# Patient Record
Sex: MALE | URBAN - METROPOLITAN AREA
[De-identification: names, ages, dates, MRNs, and addresses within clinical notes are randomized per-mention and may not be internally consistent; named-entity substitution may affect disease eponyms.]

---

## 2021-08-30 ENCOUNTER — TELEPHONE (OUTPATIENT)
Dept: OBGYN CLINIC | Facility: HOSPITAL | Age: 56
End: 2021-08-30

## 2021-08-30 NOTE — TELEPHONE ENCOUNTER
Patient will send over records to [facility] regarding shoulder sx  He needs [email or web address] be seen for an infection   [facility] fax #     [phone] # [phone]